# Patient Record
Sex: FEMALE | Race: WHITE | Employment: FULL TIME | URBAN - METROPOLITAN AREA
[De-identification: names, ages, dates, MRNs, and addresses within clinical notes are randomized per-mention and may not be internally consistent; named-entity substitution may affect disease eponyms.]

---

## 2017-03-28 ENCOUNTER — TELEPHONE (OUTPATIENT)
Dept: INTERNAL MEDICINE CLINIC | Facility: CLINIC | Age: 32
End: 2017-03-28

## 2017-03-28 NOTE — TELEPHONE ENCOUNTER
Left voice mail with patient that she No Showed her appointment today and since this was her second No Show she would be charged $50.

## 2017-04-04 ENCOUNTER — TELEPHONE (OUTPATIENT)
Dept: INTERNAL MEDICINE CLINIC | Facility: CLINIC | Age: 32
End: 2017-04-04

## 2017-04-04 NOTE — TELEPHONE ENCOUNTER
Patient called same day to cancel appointment. Notified of our [id number] cancellation policy and stated that this will be a NO SHOW. Patient stated she was told she will receive a charge for her second \"no show\" last week.   Notified that it will be the do

## 2017-04-07 ENCOUNTER — OFFICE VISIT (OUTPATIENT)
Dept: INTERNAL MEDICINE CLINIC | Facility: CLINIC | Age: 32
End: 2017-04-07

## 2017-04-07 VITALS
BODY MASS INDEX: 37 KG/M2 | DIASTOLIC BLOOD PRESSURE: 86 MMHG | HEART RATE: 82 BPM | WEIGHT: 213.25 LBS | TEMPERATURE: 98 F | SYSTOLIC BLOOD PRESSURE: 124 MMHG | OXYGEN SATURATION: 98 %

## 2017-04-07 DIAGNOSIS — Z51.81 THERAPEUTIC DRUG MONITORING: ICD-10-CM

## 2017-04-07 DIAGNOSIS — E66.9 OBESITY (BMI 30-39.9): ICD-10-CM

## 2017-04-07 DIAGNOSIS — S63.501A RIGHT WRIST SPRAIN, INITIAL ENCOUNTER: Primary | ICD-10-CM

## 2017-04-07 DIAGNOSIS — E53.8 VITAMIN B12 DEFICIENCY: ICD-10-CM

## 2017-04-07 PROCEDURE — 96372 THER/PROPH/DIAG INJ SC/IM: CPT | Performed by: INTERNAL MEDICINE

## 2017-04-07 PROCEDURE — 99214 OFFICE O/P EST MOD 30 MIN: CPT | Performed by: INTERNAL MEDICINE

## 2017-04-07 RX ORDER — PHENTERMINE HYDROCHLORIDE 37.5 MG/1
37.5 TABLET ORAL
Qty: 30 TABLET | Refills: 0 | Status: SHIPPED | OUTPATIENT
Start: 2017-04-07 | End: 2017-05-07

## 2017-04-07 RX ORDER — MELOXICAM 15 MG/1
15 TABLET ORAL DAILY
Qty: 30 TABLET | Refills: 0 | Status: SHIPPED | OUTPATIENT
Start: 2017-04-07

## 2017-04-07 RX ORDER — MULTIVIT WITH CALCIUM,IRON,MIN 18MG-0.4MG
1 TABLET ORAL DAILY
COMMUNITY

## 2017-04-07 RX ORDER — CYANOCOBALAMIN 1000 UG/ML
1000 INJECTION INTRAMUSCULAR; SUBCUTANEOUS ONCE
Status: COMPLETED | OUTPATIENT
Start: 2017-04-07 | End: 2017-04-07

## 2017-04-07 RX ADMIN — CYANOCOBALAMIN 1000 MCG: 1000 INJECTION INTRAMUSCULAR; SUBCUTANEOUS at 10:04:00

## 2017-04-07 NOTE — PROGRESS NOTES
Marion Bright is a 33year old female. HPI:   Patient presents with:  Hospital F/U: [facility] in [place] [address] 2 weeks ago dt swollen right wrist.  X-ray showed right wrist inflammation, no fracture. Was prescribed Ibuprofen 600mg.    Madeleine Valencia Unspecified gastritis and gastroduodenitis without mention of hemorrhage (1/8/2011); and Tobacco use disorder. Surgical:  has past surgical history that includes cholecystectomy (1/1/2009) and other surgical history.   Family: family history includes Diabe Team:  Hanane Lundberg MD as PCP - General (Internal Medicine)  The patient indicates understanding of these issues and agrees to the plan.   The patient is asked to return to clinic in 1 month with Dr. Dylon Baeza MD for follow up on obesity, or earlier if ac

## 2017-04-07 NOTE — PATIENT INSTRUCTIONS
- use meloxicam for pain/swelling. Stop ibuprofen. Take 1 tablet daily with meals  - Check with your insurance to see who is in network for physical therapy. - follow up with Dr. Colin Bales in 1 month for weight management.     It was a pleasure seeing you in

## 2017-05-05 ENCOUNTER — TELEPHONE (OUTPATIENT)
Dept: INTERNAL MEDICINE CLINIC | Facility: CLINIC | Age: 32
End: 2017-05-05

## (undated) NOTE — MR AVS SNAPSHOT
[place]  [address] [facility] [address]  [address] [facility] [id number] [phone]               Thank you for choosing us for your health care visit with Pedro Robbins MD.  We are glad to serve you and happy to provide you with this s SOLUBLE FIBER/PROBIOTICS OR   Take 1 capsule by mouth daily. VITAMIN D OR   Take 1 capsule by mouth daily.                 Where to Get Your Medications      These medications were sent to [facility] [facility] Get your heart pumping – brisk walking, biking, swimming Even 10 minute increments are effective and add up over the week   2 ½ hours per week – spread out over time Use a nahed to keep you motivated   Don’t forget strength training with weights and resist